# Patient Record
(demographics unavailable — no encounter records)

---

## 2025-01-14 NOTE — DISCUSSION/SUMMARY
[FreeTextEntry1] : This is a 63-year-old female with past medical history significant for atrial septal defect, status post closure with Amplatzer device in 2007, atrial fibrillation 2012 requiring synchronized electrical cardioversion, sleep apnea using a dental device, status post episode of atrial fibrillation, who comes in for followup cardiac evaluation. The patient had a normal exercise stress test January 14, 2025. She will make a follow-up appoint with her primary care physician and electrophysiologist, Dr. Evans from [facility]. Lipid panel done June 25, 2024 demonstrated cholesterol 204, HDL of 67, triglycerides 103, LDL calculated 119, non-HDL cholesterol 138, LDL direct 118 mg/dL with hemoglobin A1c of 5.4. She remains on anticoagulation with Eliquis. Echo Doppler examination done June 25, 2024 demonstrated mild mitral valve regurgitation, mild tricuspid valve regurgitation, mild pulmonic valve regurgitation, left atrial enlargement, normal ejection fraction of 64% and closure device noted in the interatrial septum. Electrocardiogram done January 23, 2024 demonstrates normal sinus rhythm rate 63 bpm is otherwise remarkable for nonspecific ST wave changes. Patient has appointment with her electrophysiologist, Dr. Bartholomew, in the next month.  She continues on Multaq and Eliquis twice per day.  Lipid panel done April 24, 2023 demonstrated cholesterol 190, HDL 67, triglycerides 107, LDL direct 110, non-HDL cholesterol 122 mg/dL and hemoglobin A1c of 5.6. Echo Doppler examination done June 22, 2022 demonstrated mild mitral valve regurgitation, mild tricuspid valve regurgitation, mild pulmonic valve regurgitation, atrial septal closure device intact without shunt present and normal left ventricular ejection fraction of 66%. She swims, does yoga and other exercises.  She will continue this exercise protocol.   The patient was recently diagnosed with hypothyroidism and started on thyroid replacement therapy. She is followed by Dr. Bartholomew of electrophysiology from [facility]. The patient had a normal exercise stress test June 22, 2022. Electrocardiogram done February 2, 2022 demonstrates sinus bradycardia rate of 60 bpm is otherwise remarkable for nonspecific ST-T wave changes. Echo Doppler examination done October 8, 2021 demonstrated minimal mitral valve regurgitation, mild tricuspid valve regurgitation, minimal pulmonic valve regurgitation with normal ejection fraction of 64% and atrial septal defect had closure device in place. The patient had a normal sleep study in 2021. Electrocardiogram done August 24, 2021 demonstrates sinus bradycardia rate 56 bpm is otherwise remarkable for left atrial abnormality. PMH:  Patient was hospitalized at [facility] for a few days July 2018 and discharged on Cardizem CD  120 mg daily, and Eliquis 5mg 2x/day.) Nuclear stress test on July 16, 2018 which demonstrated a small anterior septal apical reversible defect suggestive of mild ischemic disease.  The patient had a normal cardiac catheterization done in May 19, 2014 with no coronary artery disease. This mildly abnormal nuclear test could have represented a false positive.  PMH: The patient had another episode of palpitations in 2018, approximately 4 weeks ago after having a sugary chocolate drink at [employer]. This was self-limited and didn't require any further therapy. She is a retired . She does not drink alcohol. She has no known allergies. She has no history of hypertension.She has no history of rheumatic fever.   The patient understands that aerobic exercises must be increased to 40 minutes 4 times per week. A detailed discussion of lifestyle modification was done today. The patient has a good understanding of the diagnosis, and treatment plan. Lifestyle modification was also outlined.

## 2025-01-14 NOTE — PHYSICAL EXAM
[Well Developed] : well developed [Well Nourished] : well nourished [No Acute Distress] : no acute distress [Normal Venous Pressure] : normal venous pressure [No Carotid Bruit] : no carotid bruit [Normal S1, S2] : normal S1, S2 [No Rub] : no rub [II] : a grade 2 [Clear Lung Fields] : clear lung fields [Good Air Entry] : good air entry [No Respiratory Distress] : no respiratory distress  [Soft] : abdomen soft [Non Tender] : non-tender [No Masses/organomegaly] : no masses/organomegaly [Normal Bowel Sounds] : normal bowel sounds [Normal Gait] : normal gait [No Edema] : no edema [No Cyanosis] : no cyanosis [No Clubbing] : no clubbing [No Varicosities] : no varicosities [No Rash] : no rash [No Skin Lesions] : no skin lesions [Moves all extremities] : moves all extremities [No Focal Deficits] : no focal deficits [Normal Speech] : normal speech [Alert and Oriented] : alert and oriented [Normal memory] : normal memory [General Appearance - Well Developed] : well developed [Normal Appearance] : normal appearance [Well Groomed] : well groomed [General Appearance - Well Nourished] : well nourished [No Deformities] : no deformities [General Appearance - In No Acute Distress] : no acute distress [Normal Conjunctiva] : the conjunctiva exhibited no abnormalities [Normal Oral Mucosa] : normal oral mucosa [Normal Oropharynx] : normal oropharynx [Normal Jugular Venous A Waves Present] : normal jugular venous A waves present [Normal Jugular Venous V Waves Present] : normal jugular venous V waves present [No Jugular Venous Berumen A Waves] : no jugular venous berumen A waves [Respiration, Rhythm And Depth] : normal respiratory rhythm and effort [Exaggerated Use Of Accessory Muscles For Inspiration] : no accessory muscle use [Auscultation Breath Sounds / Voice Sounds] : lungs were clear to auscultation bilaterally [Bowel Sounds] : normal bowel sounds [Abdomen Soft] : soft [Abdomen Tenderness] : non-tender [Abnormal Walk] : normal gait [Nail Clubbing] : no clubbing of the fingernails [Cyanosis, Localized] : no localized cyanosis [Skin Color & Pigmentation] : normal skin color and pigmentation [Skin Turgor] : normal skin turgor [] : no rash [Oriented To Time, Place, And Person] : oriented to person, place, and time [Impaired Insight] : insight and judgment were intact [No Anxiety] : not feeling anxious [5th Left ICS - MCL] : palpated at the 5th LICS in the midclavicular line [Normal] : normal [No Precordial Heave] : no precordial heave was noted [Normal Rate] : normal [Heart Rate ___] : [unfilled] bpm [Rhythm Regular] : regular [Normal S1] : normal S1 [Normal S2] : normal S2 [No Gallop] : no gallop heard [I] : a grade 1 [2+] : left 2+ [No Abnormalities] : the abdominal aorta was not enlarged and no bruit was heard [No Pitting Edema] : no pitting edema present [Apical Thrill] : no thrill palpable at the apex [S3] : no S3 [S4] : no S4 [Click] : no click [Pericardial Rub] : no pericardial rub [Right Carotid Bruit] : no bruit heard over the right carotid [Left Carotid Bruit] : no bruit heard over the left carotid [Right Femoral Bruit] : no bruit heard over the right femoral artery [Left Femoral Bruit] : no bruit heard over the left femoral artery

## 2025-01-14 NOTE — REASON FOR VISIT
[CV Risk Factors and Non-Cardiac Disease] : CV risk factors and non-cardiac disease [Arrhythmia/ECG Abnorrmalities] : arrhythmia/ECG abnormalities [Consultation] : a consultation regarding [Atrial Fibrillation] : atrial fibrillation [FreeTextEntry1] : murmur, atrial septal defect, sleep apnea, AFIB

## 2025-01-14 NOTE — ASSESSMENT
[FreeTextEntry1] : This is a 63-year-old female with past medical history significant for atrial septal defect s/p closure with Amplatzer device in 2007, atrial fibrillation 2012 s/p synchronized electrical cardioversion, sleep apnea using a dental device, who comes in for follow-up cardiac evaluation.  She is a retired . She does not drink alcohol. She has no known allergies.  She has no history of hypertension. She has no history of rheumatic fever.  HPI: She is feeling generally well today and denies chest pain, dizziness, heart palpitations, recent episodes of syncope or falls, or SOB.   Current Medications: Multaq 400 mg BID and Eliquis 5 mg daily.   She states she is doing well on the Multaq and rarely experiences episodes of atrial fibrillation anymore where she feels weak and nauseous with a racing heart. No more symptoms recently. Continues to follow-up with Dr. Campbell.  She swims twice a week and does yoga and other exercises. She will continue this exercise protocol.  The patient was recently diagnosed with hypothyroidism and started on thyroid replacement therapy.   BLOOD PRESSURE: -BP is well controlled in today's visit.   BLOOD WORK: -New blood work was done 06/25/2024 to evaluate lipid profile, CBC, BMP, hepatic function, A1C and TSH. -Lipid panel done January 2024 demonstrated triglyceride 129, cholesterol 118, HDL 65, , non-. -Lipid panel done April 24, 2023 demonstrated cholesterol 190, HDL 67, triglycerides 107, LDL direct 110, non-HDL cholesterol 122 mg/dL and hemoglobin A1c of 5.6.    TESTING/REPORTS: -EKG done 06/25/2024 demonstrated regular sinus rhythm with nonspecific ST-T wave changes, BPM of 68.   -Echocardiogram done today 06/25/2024 in office with results pending.   -Electrocardiogram done January 23, 2024 demonstrates normal sinus rhythm rate 63 bpm is otherwise remarkable for nonspecific ST wave changes.  -EKG done 04/24/2023 which demonstrated regular sinus rhythm with nonspecific ST-T wave changes BPM of 60  -EKG done Oct 25, 2022 which demonstrated regular sinus rhythm with nonspecific ST-T wave changes, BPM of 66.  -The patient had a normal exercise stress test June 22, 2022.  -Electrocardiogram done February 2, 2022 demonstrates sinus bradycardia rate of 60 bpm is otherwise remarkable for nonspecific ST-T wave changes.  -Echo Doppler examination done October 8, 2021 demonstrated minimal mitral valve regurgitation, mild tricuspid valve regurgitation, minimal pulmonic valve regurgitation with normal ejection fraction of 64% and atrial septal defect had closure device in place.  -The patient had a normal sleep study in 2021.  -Electrocardiogram done August 24, 2021 demonstrates sinus bradycardia rate 56 bpm is otherwise remarkable for left atrial abnormality.  -The patient has been on Multaq 400 mg twice a day in addition to Eliquis 5 mg twice a day and has remained in normal sinus rhythm.  -PMH: Patient was hospitalized at [facility] for a few days July 2018 and discharged on Cardizem  mg daily, and Eliquis 5mg 2x/day.)  -PMH: The patient had another episode of palpitations in 2018, approximately 4 weeks ago after having a sugary chocolate drink at [facility]. This was self-limited and didn't require any further therapy.  -Nuclear stress test on July 16, 2018 which demonstrated a small anterior septal apical reversible defect suggestive of mild ischemic disease.  -The patient had a normal cardiac catheterization done in May 19, 2014 with no coronary artery disease. This mildly abnormal nuclear test could have represented a false positive.    PLAN: -The patient is clinically stable from a cardiac standpoint on today's exam. -She will continue with her current medications and will contact the office if she is having any complaints between now and their next follow up appointment. -She will schedule an exercise stress test to evaluate for significant coronary artery disease.   I have discussed the plan of care with OFELIA EDWARDS and she will follow up in 4-6 months. They are compliant with all of their medications.     The patient understands that aerobic exercises must be increased to 40 minutes 4 times/week and a detailed discussion of lifestyle modification was done today.   The patient has a good understanding of the diagnosis, treatment plan and lifestyle modification.   They will contact me at the office for any questions with their care or any changes in their health status.   The patient was discussed with supervision physician Dr. Sergey Flores at the time of the visit and the plan of care will be carried out as noted above.     HARVEY Mendez NP